# Patient Record
Sex: FEMALE | Race: WHITE | NOT HISPANIC OR LATINO | URBAN - METROPOLITAN AREA
[De-identification: names, ages, dates, MRNs, and addresses within clinical notes are randomized per-mention and may not be internally consistent; named-entity substitution may affect disease eponyms.]

---

## 2018-11-13 ENCOUNTER — EMERGENCY (EMERGENCY)
Facility: HOSPITAL | Age: 23
LOS: 1 days | Discharge: HOME | End: 2018-11-13
Attending: EMERGENCY MEDICINE
Payer: COMMERCIAL

## 2018-11-13 VITALS
RESPIRATION RATE: 20 BRPM | HEART RATE: 89 BPM | SYSTOLIC BLOOD PRESSURE: 115 MMHG | OXYGEN SATURATION: 100 % | DIASTOLIC BLOOD PRESSURE: 74 MMHG

## 2018-11-13 VITALS
DIASTOLIC BLOOD PRESSURE: 29 MMHG | HEIGHT: 61 IN | WEIGHT: 95.9 LBS | SYSTOLIC BLOOD PRESSURE: 43 MMHG | OXYGEN SATURATION: 99 % | TEMPERATURE: 98 F | RESPIRATION RATE: 18 BRPM | HEART RATE: 56 BPM

## 2018-11-13 DIAGNOSIS — R55 SYNCOPE AND COLLAPSE: ICD-10-CM

## 2018-11-13 DIAGNOSIS — R42 DIZZINESS AND GIDDINESS: ICD-10-CM

## 2018-11-13 LAB
ALBUMIN SERPL ELPH-MCNC: 4 G/DL — SIGNIFICANT CHANGE UP (ref 3.3–5)
ALP SERPL-CCNC: 54 U/L — SIGNIFICANT CHANGE UP (ref 40–120)
ALT FLD-CCNC: 17 U/L — SIGNIFICANT CHANGE UP (ref 12–78)
ANION GAP SERPL CALC-SCNC: 9 MMOL/L — SIGNIFICANT CHANGE UP (ref 5–17)
APPEARANCE UR: CLEAR — SIGNIFICANT CHANGE UP
APTT BLD: 23.1 SEC — LOW (ref 27.5–36.3)
AST SERPL-CCNC: 12 U/L — LOW (ref 15–37)
BILIRUB SERPL-MCNC: 0.5 MG/DL — SIGNIFICANT CHANGE UP (ref 0.2–1.2)
BILIRUB UR-MCNC: NEGATIVE — SIGNIFICANT CHANGE UP
BUN SERPL-MCNC: 12 MG/DL — SIGNIFICANT CHANGE UP (ref 7–23)
CALCIUM SERPL-MCNC: 8.9 MG/DL — SIGNIFICANT CHANGE UP (ref 8.5–10.1)
CHLORIDE SERPL-SCNC: 107 MMOL/L — SIGNIFICANT CHANGE UP (ref 96–108)
CO2 SERPL-SCNC: 23 MMOL/L — SIGNIFICANT CHANGE UP (ref 22–31)
COLOR SPEC: YELLOW — SIGNIFICANT CHANGE UP
CREAT SERPL-MCNC: 0.83 MG/DL — SIGNIFICANT CHANGE UP (ref 0.5–1.3)
DIFF PNL FLD: ABNORMAL
GLUCOSE SERPL-MCNC: 113 MG/DL — HIGH (ref 70–99)
GLUCOSE UR QL: NEGATIVE MG/DL — SIGNIFICANT CHANGE UP
HCG SERPL-ACNC: <1 MIU/ML — SIGNIFICANT CHANGE UP
HCT VFR BLD CALC: 39.2 % — SIGNIFICANT CHANGE UP (ref 34.5–45)
HGB BLD-MCNC: 13.7 G/DL — SIGNIFICANT CHANGE UP (ref 11.5–15.5)
INR BLD: 0.96 RATIO — SIGNIFICANT CHANGE UP (ref 0.88–1.16)
KETONES UR-MCNC: NEGATIVE — SIGNIFICANT CHANGE UP
LEUKOCYTE ESTERASE UR-ACNC: NEGATIVE — SIGNIFICANT CHANGE UP
MCHC RBC-ENTMCNC: 27.4 PG — SIGNIFICANT CHANGE UP (ref 27–34)
MCHC RBC-ENTMCNC: 34.9 GM/DL — SIGNIFICANT CHANGE UP (ref 32–36)
MCV RBC AUTO: 78.4 FL — LOW (ref 80–100)
NITRITE UR-MCNC: NEGATIVE — SIGNIFICANT CHANGE UP
NRBC # BLD: 0 /100 WBCS — SIGNIFICANT CHANGE UP (ref 0–0)
PH UR: 6.5 — SIGNIFICANT CHANGE UP (ref 5–8)
PLATELET # BLD AUTO: 96 K/UL — LOW (ref 150–400)
POTASSIUM SERPL-MCNC: 3.8 MMOL/L — SIGNIFICANT CHANGE UP (ref 3.5–5.3)
POTASSIUM SERPL-SCNC: 3.8 MMOL/L — SIGNIFICANT CHANGE UP (ref 3.5–5.3)
PROT SERPL-MCNC: 7.7 GM/DL — SIGNIFICANT CHANGE UP (ref 6–8.3)
PROT UR-MCNC: 15 MG/DL
PROTHROM AB SERPL-ACNC: 10.7 SEC — SIGNIFICANT CHANGE UP (ref 10–12.9)
RBC # BLD: 5 M/UL — SIGNIFICANT CHANGE UP (ref 3.8–5.2)
RBC # FLD: 12.8 % — SIGNIFICANT CHANGE UP (ref 10.3–14.5)
SODIUM SERPL-SCNC: 139 MMOL/L — SIGNIFICANT CHANGE UP (ref 135–145)
SP GR SPEC: 1.01 — SIGNIFICANT CHANGE UP (ref 1.01–1.02)
UROBILINOGEN FLD QL: NEGATIVE MG/DL — SIGNIFICANT CHANGE UP
WBC # BLD: 3.89 K/UL — SIGNIFICANT CHANGE UP (ref 3.8–10.5)
WBC # FLD AUTO: 3.89 K/UL — SIGNIFICANT CHANGE UP (ref 3.8–10.5)

## 2018-11-13 PROCEDURE — 99284 EMERGENCY DEPT VISIT MOD MDM: CPT

## 2018-11-13 PROCEDURE — 76856 US EXAM PELVIC COMPLETE: CPT | Mod: 26

## 2018-11-13 RX ORDER — SODIUM CHLORIDE 9 MG/ML
2000 INJECTION INTRAMUSCULAR; INTRAVENOUS; SUBCUTANEOUS ONCE
Qty: 0 | Refills: 0 | Status: DISCONTINUED | OUTPATIENT
Start: 2018-11-13 | End: 2018-11-13

## 2018-11-13 NOTE — ED ADULT NURSE NOTE - NSIMPLEMENTINTERV_GEN_ALL_ED
Implemented All Universal Safety Interventions:  [place] to call system. Call bell, personal items and telephone within reach. Instruct patient to call for assistance. Room bathroom lighting operational. Non-slip footwear when patient is off stretcher. Physically safe environment: no spills, clutter or unnecessary equipment. Stretcher in lowest position, wheels locked, appropriate side rails in place.

## 2018-11-13 NOTE — ED PROVIDER NOTE - PHYSICAL EXAMINATION
Vitals: hypotensive initially (large cuff used to get pressure), repeat was 120 systolic, normal vitals  Gen: AAOx3, NAD, sitting comfortably in stretcher, calm, cooperative, pleasant demeanor   Head: ncat, perrla, eomi b/l  Neck: supple, no lymphadenopathy, no midline deviation  Heart: rrr, no m/r/g  Lungs: CTA b/l, no rales/ronchi/wheezes  Abd: soft, nontender, non-distended, no rebound or guarding  Ext: no clubbing/cyanosis/edema  Neuro: sensation and muscle strength intact b/l, no focal weakness

## 2018-11-13 NOTE — ED ADULT NURSE NOTE - OBJECTIVE STATEMENT
Pt s/p sycopal episode in Phx. Rapid response called. per witness, Pt stuck herself with a cleaned needle and suddenly reported not feeling and passed out on a chair. no heaad injury. Pt reported being on her period. VS within normal limit on assesssment.

## 2018-11-13 NOTE — ED PROVIDER NOTE - PROGRESS NOTE DETAILS
Results reported to patient--grossly benign, labs wnl, likely vagal syncope  Pt. reports feeling better after meds  pt. agrees to f/u with primary care outpt.  pt. understands to return to ED if symptoms worsen; will d/c

## 2018-11-13 NOTE — ED ADULT NURSE REASSESSMENT NOTE - NS ED NURSE REASSESS COMMENT FT1
pt verbalized feeing better d/c ready in stable condition pt instructed to f/u with pmd if symptoms persist, pt left ED ambulatory in no acute distress.

## 2018-11-13 NOTE — ED PROVIDER NOTE - MEDICAL DECISION MAKING DETAILS
21 yo F with likely vagal syncope 2/2 needle stick, r/o ectopic (pelvic pain, syncope. hypotension)  -basic labs, hcg, coags, type and screen, ua, cx, monitor, ns hydration, us pelvis stat  -f/u results, reeval

## 2018-11-14 LAB
CULTURE RESULTS: NO GROWTH — SIGNIFICANT CHANGE UP
SPECIMEN SOURCE: SIGNIFICANT CHANGE UP

## 2022-02-08 ENCOUNTER — TRANSCRIPTION ENCOUNTER (OUTPATIENT)
Age: 27
End: 2022-02-08

## 2022-02-08 ENCOUNTER — NON-APPOINTMENT (OUTPATIENT)
Age: 27
End: 2022-02-08

## 2022-02-08 PROBLEM — D69.6 THROMBOCYTOPENIA, UNSPECIFIED: Chronic | Status: ACTIVE | Noted: 2018-11-13

## 2022-02-09 ENCOUNTER — APPOINTMENT (OUTPATIENT)
Dept: CARDIOLOGY | Facility: CLINIC | Age: 27
End: 2022-02-09

## 2022-11-19 NOTE — ED ADULT NURSE NOTE - INTEGUMENTARY WDL
Discharge planning    Updated per SS and clinical lead patient will need iv atb on discharge. Call to  [facility] outpatient infusion unit ( [phone]) and unable to set up until Monday as they would need to run through insurance. Patient can also have home care but [facility] would need iv atb script by noon on Monday to fill. Spoke with patient and she wishes to do this as home care. Call to [facility] and they will look but think they can take patient with start of care on Tuesday. Patient will be on iv rocephin daily for 7 days. Dr Guy to to complete rory and rx for writer to send to [facility] today in hopes can discharge home on Monday after rocephin dose. Referral given to ángela [facility]gr [facility]. Sent in epic also. [phone] with jose from [facility] and referral given. She will run the script  first thing Monday morning.      Shane Andre can accept with start of care Tuesday Color consistent with ethnicity/race, warm, dry intact, resilient.

## 2025-06-02 NOTE — ED ADULT TRIAGE NOTE - WEIGHT IN KG
FOLLOW UP:  NON INTERVENTIONAL PAIN MANAGEMENT    PRIMARY CARE PHYSICIAN:  Raymon Fernandez DO    Chief Complaint   Patient presents with    Medication Management    Office Visit    Back Pain     LBP    Knee Pain     L       Notes and screening assessment scores from Nursing intake documentation has been reviewed.     ASSESSMENT:  1. Chronic midline low back pain without sciatica    2. Chronic pain of both knees          PLAN:  Medical Modalities:  Hydrocodone-acetaminophen  mg 5 times per day; at her next visit we will plan to taper this slightly (4.5 tablets/day for 28 days, then 4 tablets/day thereafter)  Tizanidine 4 mg at bedtime as needed  Acetaminophen not to exceed 3 g daily    Lifestyle & Behavioral:  Pain Neuroscience Education concepts reinforced  Additional resources in AVS  Sleep and bowel hygiene  Anti-inflammatory diet, nutritional supplements  Work/rest pacing  Physical exercise is imperative -  incorporating activities that improve cardiovascular fitness, increase lean muscle mass/bone density, and maintain flexibility and balance.  Exercise ensures continued benefit for overall health, mood, sleep, and wellbeing.  Movement should be enjoyable while promoting confidence and safety.  Recommend adopting a practice of mindful meditation to calm the nervous system  Regular primary care follow-up for routine health maintenance  Stress reduction, self-care, sense of purpose    Labs/Imaging:  None    Manual Therapies:   Refer to aquatic PT    Total visit time 35 minutes. In addition to face to face time, this includes time spent reviewing past medical documents, labs, imaging, referral notes; and coordination of care.     Follow up Return in about 3 months (around 9/2/2025).    INTERVAL HISTORY:  Estela Marie is a 58 year old female returning for follow-up regarding low back pain and bilateral knee pain left greater than right.  She says she has been \"trying\" to skip doses of  hydrocodone but its pretty hard.  She knows that weight loss would be helpful but that is also difficult, she cannot afford a GLP-1.  She wants to try increasing physical activity but she does not have much stamina.  She does not think she eats all that much and so retaining excess weight is confusing.  She denies recent illness or acute injury.  She is compliant with follow-up in primary care and other medical subspecialties.  Mood stable, sleep adequate.      MEDICATION THERAPY MONITORING:  Activity/ADLs: Independent  Analgesia: Good  Adverse effects: None  Aberrant behavior: None  Affect: Animated    CONTROLLED SUBSTANCE RISK ASSESSMENT:  COAT goals: Less pain  ORT 1 (needs repeating)  PDMP reviewed:  Yes  Most recent UDS: Consistent  Overall opioid risk is deemed to be low-moderate (hyperfocus on opioids, cannabis use)  Opioid Consent/Agreement: Intact  Current MME: 50    CURRENT MEDICATIONS  Per Epic Record.    TREATMENTS PREVIOUSLY TRIED:  Surgery: 10/23/2024 left total knee arthroplasty  Physical Therapy: ineffective  Chiropractor:   Acupuncture: effective  Massage:   Epidurals/Injections: ineffective  Trigger Point Injections:     TENS: Ineffective  Muscle Relaxers: Tizanidine-effective at night, Skelaxin-ineffective  NSAIDS: Ibuprofen, Naproxen, diclofenac, celecoxib, meloxicam- ineffective  Gabapentin: ineffective  Pregabalin: ineffective   Duloxetine: ineffective  Venlafaxine:   Topiramate: Effective but caused tingling  Topicals: Voltaren 1% gel-ineffective, lidocaine patches-ineffective, lidocaine 4% cream-temporary relief  Zonisamide: Nausea  Acetaminophen: Ineffective    ALLERGIES:  Reviewed.    ROS Negative except as listed in HPI.    PHYSICAL EXAM:  Visit Vitals  Ht 5' 4\" (1.626 m)   BMI 47.57 kg/m²     General:  Alert, appropriate.  Does not appear somnolent nor intoxicated.  In no apparent distress.  HENT: Mucous membranes moist  Eyes: Pupils are not inappropriately constricted or dilated.   Conjunctivae pink.  Sclera anicteric  Neck:  Supple.    Respiratory: Respirations unlabored.   Peripheral Vascular:   No peripheral edema.  Integumentary:  Warm without rash  Musculoskeletal: Unchanged  Neurologic: Unchanged    PERTINENT IMAGING REPORTS AND DIAGNOSTIC STUDIES:  No recent pertinent diagnostic musculoskeletal imaging  No recent pertinent labs    Supervising Physician: Dr. Domingo Van     43.5